# Patient Record
Sex: FEMALE | Race: WHITE
[De-identification: names, ages, dates, MRNs, and addresses within clinical notes are randomized per-mention and may not be internally consistent; named-entity substitution may affect disease eponyms.]

---

## 2020-02-09 ENCOUNTER — HOSPITAL ENCOUNTER (INPATIENT)
Dept: HOSPITAL 83 - ED | Age: 75
LOS: 4 days | Discharge: HOME | DRG: 378 | End: 2020-02-13
Attending: INTERNAL MEDICINE | Admitting: INTERNAL MEDICINE
Payer: MEDICARE

## 2020-02-09 VITALS — WEIGHT: 163.44 LBS | HEIGHT: 60 IN | BODY MASS INDEX: 32.09 KG/M2

## 2020-02-09 VITALS — DIASTOLIC BLOOD PRESSURE: 88 MMHG

## 2020-02-09 DIAGNOSIS — Z79.899: ICD-10-CM

## 2020-02-09 DIAGNOSIS — K29.71: ICD-10-CM

## 2020-02-09 DIAGNOSIS — K44.9: ICD-10-CM

## 2020-02-09 DIAGNOSIS — K25.4: Primary | ICD-10-CM

## 2020-02-09 DIAGNOSIS — E87.8: ICD-10-CM

## 2020-02-09 DIAGNOSIS — S09.90XA: ICD-10-CM

## 2020-02-09 DIAGNOSIS — Z87.891: ICD-10-CM

## 2020-02-09 DIAGNOSIS — K21.9: ICD-10-CM

## 2020-02-09 DIAGNOSIS — I16.1: ICD-10-CM

## 2020-02-09 DIAGNOSIS — I10: ICD-10-CM

## 2020-02-09 DIAGNOSIS — D72.829: ICD-10-CM

## 2020-02-10 VITALS — DIASTOLIC BLOOD PRESSURE: 105 MMHG

## 2020-02-10 VITALS — DIASTOLIC BLOOD PRESSURE: 125 MMHG

## 2020-02-10 VITALS — SYSTOLIC BLOOD PRESSURE: 154 MMHG | DIASTOLIC BLOOD PRESSURE: 70 MMHG

## 2020-02-10 VITALS — DIASTOLIC BLOOD PRESSURE: 110 MMHG

## 2020-02-10 VITALS — DIASTOLIC BLOOD PRESSURE: 71 MMHG | SYSTOLIC BLOOD PRESSURE: 151 MMHG

## 2020-02-10 VITALS — SYSTOLIC BLOOD PRESSURE: 203 MMHG | DIASTOLIC BLOOD PRESSURE: 116 MMHG

## 2020-02-10 VITALS — DIASTOLIC BLOOD PRESSURE: 63 MMHG

## 2020-02-10 VITALS — DIASTOLIC BLOOD PRESSURE: 57 MMHG | SYSTOLIC BLOOD PRESSURE: 137 MMHG

## 2020-02-10 VITALS — DIASTOLIC BLOOD PRESSURE: 89 MMHG

## 2020-02-10 VITALS — DIASTOLIC BLOOD PRESSURE: 104 MMHG | SYSTOLIC BLOOD PRESSURE: 185 MMHG

## 2020-02-10 VITALS — DIASTOLIC BLOOD PRESSURE: 80 MMHG

## 2020-02-10 VITALS — DIASTOLIC BLOOD PRESSURE: 93 MMHG

## 2020-02-10 VITALS — SYSTOLIC BLOOD PRESSURE: 180 MMHG | DIASTOLIC BLOOD PRESSURE: 103 MMHG

## 2020-02-10 VITALS — DIASTOLIC BLOOD PRESSURE: 68 MMHG

## 2020-02-10 VITALS — DIASTOLIC BLOOD PRESSURE: 78 MMHG

## 2020-02-10 LAB
ALBUMIN SERPL-MCNC: 3.8 GM/DL (ref 3.1–4.5)
ALP SERPL-CCNC: 116 U/L (ref 45–117)
ALT SERPL W P-5'-P-CCNC: 27 U/L (ref 12–78)
APPEARANCE UR: (no result)
AST SERPL-CCNC: 41 IU/L (ref 3–35)
BACTERIA #/AREA URNS HPF: (no result) /[HPF]
BILIRUB UR QL STRIP: NEGATIVE
BUN SERPL-MCNC: 15 MG/DL (ref 7–24)
CHLORIDE SERPL-SCNC: 108 MMOL/L (ref 98–107)
COLOR UR: YELLOW
CREAT SERPL-MCNC: 0.92 MG/DL (ref 0.55–1.02)
EPI CELLS #/AREA URNS HPF: (no result) /[HPF]
ERYTHROCYTE [DISTWIDTH] IN BLOOD BY AUTOMATED COUNT: 13 % (ref 0–14.5)
GLUCOSE UR QL: NEGATIVE
HCT VFR BLD AUTO: 39.8 % (ref 37–47)
HCT VFR BLD AUTO: 43.8 % (ref 37–47)
HGB BLD-MCNC: 13.7 G/DL (ref 12–16)
HGB BLD-MCNC: 15 G/DL (ref 12–16)
HGB UR QL STRIP: (no result)
INR BLD: 1 (ref 2–3.5)
KETONES UR QL STRIP: NEGATIVE
LEUKOCYTE ESTERASE UR QL STRIP: NEGATIVE
MCH RBC QN AUTO: 30.8 PG (ref 27–31)
MCHC RBC AUTO-ENTMCNC: 34.2 G/DL (ref 33–37)
MCV RBC AUTO: 89.9 FL (ref 81–99)
NITRITE UR QL STRIP: NEGATIVE
NRBC BLD QL AUTO: 0 % (ref 0–0)
PH UR STRIP: 6 [PH] (ref 5–9)
PLATELET # BLD AUTO: 143 10*3/UL (ref 130–400)
PLATELET SUFFICIENCY: NORMAL
PMV BLD AUTO: 12.7 FL (ref 9.6–12.3)
POTASSIUM SERPL-SCNC: 3.5 MMOL/L (ref 3.5–5.1)
PROT SERPL-MCNC: 7.3 GM/DL (ref 6.4–8.2)
RBC # BLD AUTO: 4.87 10*6/UL (ref 4.1–5.1)
RBC #/AREA URNS HPF: (no result) RBC/HPF (ref 0–2)
RBC MORPH BLD: NORMAL
SODIUM SERPL-SCNC: 138 MMOL/L (ref 136–145)
SP GR UR: 1.02 (ref 1–1.03)
TOTAL CELLS COUNTED: 100 #CELLS
TROPONIN I SERPL-MCNC: < 0.015 NG/ML (ref ?–0.04)
UROBILINOGEN UR STRIP-MCNC: 0.2 E.U./DL (ref 0.2–1)
WBC #/AREA URNS HPF: (no result) WBC/HPF (ref 0–5)
WBC NRBC COR # BLD AUTO: 14.1 10*3/UL (ref 4.8–10.8)

## 2020-02-10 NOTE — NUR
A 74, admitted to , under the
services of REYNA Coffman DO with a diagnosis of GI BLEED, HTN.
Chief complaint is FALL, NAUSEA, VOMITING.
Patient arrived via bed from ER.
Monitor applied. Initial assessment completed.
Vital signs taken and recorded.
REYNA COFFMAN DO notified of admission to the unit.
Orders received.
See assessment for past medical history, medications
and allergies.
Patient and/or family oriented to unit. [facility]
visitation policy reviewed.
Clothing/patient valuable form completed.
 
PARMINDER GILLIAM

## 2020-02-10 NOTE — NUR
PT RESTING WITHOUT ANY COMPLAINTS, NO ACTIVE VOIMTING NOTED. FAMILY AT THE
BEDSIDE. PT WAS WANTING TO BE ADMITTED HERE IF POSSIBLE. NOTIFIED DR RANDOLPH
WITH THERE REQUEST. HOSPITALIST TO BE NOTIFIED.

## 2020-02-11 VITALS — DIASTOLIC BLOOD PRESSURE: 84 MMHG

## 2020-02-11 VITALS — SYSTOLIC BLOOD PRESSURE: 184 MMHG | DIASTOLIC BLOOD PRESSURE: 90 MMHG

## 2020-02-11 VITALS — DIASTOLIC BLOOD PRESSURE: 86 MMHG

## 2020-02-11 VITALS — DIASTOLIC BLOOD PRESSURE: 56 MMHG

## 2020-02-11 VITALS — DIASTOLIC BLOOD PRESSURE: 85 MMHG | SYSTOLIC BLOOD PRESSURE: 147 MMHG

## 2020-02-11 VITALS — SYSTOLIC BLOOD PRESSURE: 140 MMHG | DIASTOLIC BLOOD PRESSURE: 80 MMHG

## 2020-02-11 VITALS — DIASTOLIC BLOOD PRESSURE: 80 MMHG

## 2020-02-11 LAB
25(OH)D3 SERPL-MCNC: 18.1 NG/ML (ref 30–100)
ALBUMIN SERPL-MCNC: 3.5 GM/DL (ref 3.1–4.5)
ALP SERPL-CCNC: 102 U/L (ref 45–117)
ALT SERPL W P-5'-P-CCNC: 28 U/L (ref 12–78)
AST SERPL-CCNC: 39 IU/L (ref 3–35)
BASOPHILS # BLD AUTO: 0 10*3/UL (ref 0–0.1)
BASOPHILS NFR BLD AUTO: 0.2 % (ref 0–1)
BUN SERPL-MCNC: 12 MG/DL (ref 7–24)
CHLORIDE SERPL-SCNC: 109 MMOL/L (ref 98–107)
CHOLEST SERPL-MCNC: 150 MG/DL (ref ?–200)
CREAT SERPL-MCNC: 0.89 MG/DL (ref 0.55–1.02)
EOSINOPHIL # BLD AUTO: 0 10*3/UL (ref 0–0.4)
EOSINOPHIL # BLD AUTO: 0.1 % (ref 1–4)
ERYTHROCYTE [DISTWIDTH] IN BLOOD BY AUTOMATED COUNT: 13.2 % (ref 0–14.5)
HCT VFR BLD AUTO: 41.7 % (ref 37–47)
HDLC SERPL-MCNC: 50 MG/DL (ref 40–60)
HGB BLD-MCNC: 14.1 G/DL (ref 12–16)
LDLC SERPL DIRECT ASSAY-MCNC: 76 MG/DL (ref 9–159)
LYMPHOCYTES # BLD AUTO: 1.5 10*3/UL (ref 1.3–4.4)
LYMPHOCYTES NFR BLD AUTO: 16.3 % (ref 27–41)
MCH RBC QN AUTO: 30.2 PG (ref 27–31)
MCHC RBC AUTO-ENTMCNC: 33.8 G/DL (ref 33–37)
MCV RBC AUTO: 89.3 FL (ref 81–99)
MONOCYTES # BLD AUTO: 0.5 10*3/UL (ref 0.1–1)
MONOCYTES NFR BLD MANUAL: 5.1 % (ref 3–9)
NEUT #: 7.3 10*3/UL (ref 2.3–7.9)
NEUT %: 77.9 % (ref 47–73)
NRBC BLD QL AUTO: 0 10*3/UL (ref 0–0)
PHOSPHATE SERPL-MCNC: 1.7 MG/DL (ref 2.5–4.9)
PLATELET # BLD AUTO: 149 10*3/UL (ref 130–400)
PMV BLD AUTO: 12.9 FL (ref 9.6–12.3)
POTASSIUM SERPL-SCNC: 3.2 MMOL/L (ref 3.5–5.1)
PROT SERPL-MCNC: 7.1 GM/DL (ref 6.4–8.2)
RBC # BLD AUTO: 4.67 10*6/UL (ref 4.1–5.1)
SODIUM SERPL-SCNC: 140 MMOL/L (ref 136–145)
T4 FREE SERPL-MCNC: 1.22 NG/DL (ref 0.76–1.46)
TRIGL SERPL-MCNC: 122 MG/DL (ref ?–150)
TSH SERPL DL<=0.005 MIU/L-ACNC: 4.02 UIU/ML (ref 0.36–4.75)
VITAMIN B12: 342 PG/ML (ref 247–911)
VLDLC SERPL CALC-MCNC: 24 MG/DL (ref 6–40)
WBC NRBC COR # BLD AUTO: 9.4 10*3/UL (ref 4.8–10.8)

## 2020-02-11 NOTE — NUR
PHYSICAL THERAPY
 
Screen received pt is from home with fall down steps and GI bleed. Pt would
benefit from PT consult to assess mobility and overall safety once medically
appropriate, thank you
 
 
Shabnam Modi PT

## 2020-02-11 NOTE — NUR
in to talk to patient.
Patient states lives at home with her daughter, son-in-law, and grandson.
There are 12 steps in the home.
Physician: no family physician
Pharmacy: [facility]
Home health services: none
Patient's level of ADLs: INDEPENDENT
Patient has working utilities: yes
DME: none
Follow-up physician's appointment after d/c: will be made by the hospitalist
nurse director upon discharge
Does patient want to access PORTAL?: no
Discharge plan discussed with patient. She lives at home with her family. She
is independent in her ADLs and ambulation. Discussed home health care services
and she denies any home needs at this time. When medically stable she will be
discharged to home. She states her daughter will provide transportation on
discharge.
 
OLIMPIA TAYLOR

## 2020-02-11 NOTE — NUR
BLOOD PRESSURE 182/84 MANUALLY. MEDICATED WITH APRESOLINE 10MG IV PER PRN
ORDER. WILL CONTINUE TO MONITOR.

## 2020-02-11 NOTE — NUR
Nursing screen received and chart reviewed. Patient admitted for GI bleed and
a fall. If patient has a decline in ADLs, functional transfers, and mobility,
please send OT orders. Thank you.
 
Sugey Brewer, OTR/L

## 2020-02-11 NOTE — NUR
PT RESTING IN BED TALKING WITH FAMILY, A&O, PLEASANT AND COOPERATIVE. RESP
NONLABORED. NO ACUTE DISTRESS NOTED. NO COMPLAINTS VOICED. IRMA PATENT.

## 2020-02-11 NOTE — NUR
PATIENT C/O RIGHT HIP PAIN WITH MOVEMENT AND AMBULATION. MEDICATED WITH ULTRAM
PER PRN ORDER. WILL CONTINUE TO MONITOR.

## 2020-02-11 NOTE — NUR
PT RESTING IN BED WITH EYES CLOSED, AWAKENS WITH EASE. RESP NONLABORED. NO
ACUTE DISTRESS NOTED. NO COMPLAINTS VOICED. IV PATENT AND IVF'S INFUSING AS
ORDERED WITHOUT DIFFICULTY.

## 2020-02-12 VITALS — DIASTOLIC BLOOD PRESSURE: 82 MMHG

## 2020-02-12 VITALS — DIASTOLIC BLOOD PRESSURE: 90 MMHG

## 2020-02-12 VITALS — DIASTOLIC BLOOD PRESSURE: 69 MMHG

## 2020-02-12 VITALS — DIASTOLIC BLOOD PRESSURE: 76 MMHG

## 2020-02-12 VITALS — DIASTOLIC BLOOD PRESSURE: 86 MMHG

## 2020-02-12 VITALS — SYSTOLIC BLOOD PRESSURE: 149 MMHG | DIASTOLIC BLOOD PRESSURE: 64 MMHG

## 2020-02-12 VITALS — DIASTOLIC BLOOD PRESSURE: 86 MMHG | SYSTOLIC BLOOD PRESSURE: 164 MMHG

## 2020-02-12 LAB
ALBUMIN SERPL-MCNC: 4 GM/DL (ref 3.1–4.5)
ALP SERPL-CCNC: 119 U/L (ref 45–117)
ALT SERPL W P-5'-P-CCNC: 31 U/L (ref 12–78)
AST SERPL-CCNC: 38 IU/L (ref 3–35)
BASOPHILS # BLD AUTO: 0 10*3/UL (ref 0–0.1)
BASOPHILS NFR BLD AUTO: 0.2 % (ref 0–1)
BUN SERPL-MCNC: 12 MG/DL (ref 7–24)
CHLORIDE SERPL-SCNC: 107 MMOL/L (ref 98–107)
CREAT SERPL-MCNC: 0.95 MG/DL (ref 0.55–1.02)
EOSINOPHIL # BLD AUTO: 0 10*3/UL (ref 0–0.4)
EOSINOPHIL # BLD AUTO: 0.3 % (ref 1–4)
ERYTHROCYTE [DISTWIDTH] IN BLOOD BY AUTOMATED COUNT: 13.2 % (ref 0–14.5)
HCT VFR BLD AUTO: 46 % (ref 37–47)
HGB BLD-MCNC: 15.8 G/DL (ref 12–16)
LYMPHOCYTES # BLD AUTO: 2.7 10*3/UL (ref 1.3–4.4)
LYMPHOCYTES NFR BLD AUTO: 24 % (ref 27–41)
MCH RBC QN AUTO: 30.7 PG (ref 27–31)
MCHC RBC AUTO-ENTMCNC: 34.3 G/DL (ref 33–37)
MCV RBC AUTO: 89.3 FL (ref 81–99)
MONOCYTES # BLD AUTO: 0.7 10*3/UL (ref 0.1–1)
MONOCYTES NFR BLD MANUAL: 6.1 % (ref 3–9)
NEUT #: 7.7 10*3/UL (ref 2.3–7.9)
NEUT %: 69.1 % (ref 47–73)
NRBC BLD QL AUTO: 0 10*3/UL (ref 0–0)
PLATELET # BLD AUTO: 217 10*3/UL (ref 130–400)
PMV BLD AUTO: 12.9 FL (ref 9.6–12.3)
POTASSIUM SERPL-SCNC: 3.5 MMOL/L (ref 3.5–5.1)
PROT SERPL-MCNC: 7.4 GM/DL (ref 6.4–8.2)
RBC # BLD AUTO: 5.15 10*6/UL (ref 4.1–5.1)
SODIUM SERPL-SCNC: 136 MMOL/L (ref 136–145)
WBC NRBC COR # BLD AUTO: 11.1 10*3/UL (ref 4.8–10.8)

## 2020-02-12 PROCEDURE — 0DB68ZX EXCISION OF STOMACH, VIA NATURAL OR ARTIFICIAL OPENING ENDOSCOPIC, DIAGNOSTIC: ICD-10-PCS | Performed by: INTERNAL MEDICINE

## 2020-02-12 NOTE — NUR
MORNING ASSESSMENT COMPLETE. PT SLEEPING, AROUSES EASILY. NO VOICED COMPLAINTS
AT THIS TIME.   [facility]

## 2020-02-12 NOTE — NUR
PT VISITING FAMILY, WAITING TO GO DOWN FOR EGD. PATIENT PLEASANT AND
COOPERATIVE.  [facility]

## 2020-02-12 NOTE — NUR
PATIENT ASSESSMENT COMPLETED AT THIS TIME WITHOUT INCIDENT, MEDICATIONS GIVEN
WITHOUT INCIDENT. CALL LIGHT WITHIN REACH WILL CONTINUE TO MONITOR.

## 2020-02-13 VITALS — SYSTOLIC BLOOD PRESSURE: 157 MMHG | DIASTOLIC BLOOD PRESSURE: 83 MMHG

## 2020-02-13 VITALS — SYSTOLIC BLOOD PRESSURE: 150 MMHG | DIASTOLIC BLOOD PRESSURE: 77 MMHG

## 2020-02-13 VITALS — DIASTOLIC BLOOD PRESSURE: 92 MMHG

## 2020-03-04 ENCOUNTER — HOSPITAL ENCOUNTER (OUTPATIENT)
Dept: HOSPITAL 83 - RESCLI | Age: 75
Discharge: HOME | End: 2020-03-04
Attending: INTERNAL MEDICINE
Payer: MEDICARE

## 2020-03-04 DIAGNOSIS — T39.395D: Primary | ICD-10-CM

## 2020-03-04 DIAGNOSIS — H35.30: ICD-10-CM

## 2020-03-04 DIAGNOSIS — I10: ICD-10-CM

## 2020-03-04 DIAGNOSIS — Z53.20: ICD-10-CM

## 2020-03-04 LAB
BASOPHILS # BLD AUTO: 0 10*3/UL (ref 0–0.1)
BASOPHILS NFR BLD AUTO: 0.3 % (ref 0–1)
EOSINOPHIL # BLD AUTO: 0 10*3/UL (ref 0–0.4)
EOSINOPHIL # BLD AUTO: 0.4 % (ref 1–4)
ERYTHROCYTE [DISTWIDTH] IN BLOOD BY AUTOMATED COUNT: 12.9 % (ref 0–14.5)
HCT VFR BLD AUTO: 44.5 % (ref 37–47)
HGB BLD-MCNC: 15.5 G/DL (ref 12–16)
LYMPHOCYTES # BLD AUTO: 1.5 10*3/UL (ref 1.3–4.4)
LYMPHOCYTES NFR BLD AUTO: 19.1 % (ref 27–41)
MCH RBC QN AUTO: 30.4 PG (ref 27–31)
MCHC RBC AUTO-ENTMCNC: 34.8 G/DL (ref 33–37)
MCV RBC AUTO: 87.3 FL (ref 81–99)
MONOCYTES # BLD AUTO: 0.5 10*3/UL (ref 0.1–1)
MONOCYTES NFR BLD MANUAL: 6 % (ref 3–9)
NEUT #: 5.9 10*3/UL (ref 2.3–7.9)
NEUT %: 73.8 % (ref 47–73)
NRBC BLD QL AUTO: 0 % (ref 0–0)
PLATELET # BLD AUTO: 200 10*3/UL (ref 130–400)
PMV BLD AUTO: 11.9 FL (ref 9.6–12.3)
RBC # BLD AUTO: 5.1 10*6/UL (ref 4.1–5.1)
WBC NRBC COR # BLD AUTO: 8 10*3/UL (ref 4.8–10.8)

## 2020-06-16 ENCOUNTER — HOSPITAL ENCOUNTER (OUTPATIENT)
Dept: HOSPITAL 83 - RESCLI | Age: 75
Discharge: HOME | End: 2020-06-16
Attending: INTERNAL MEDICINE
Payer: MEDICARE

## 2020-06-16 DIAGNOSIS — R00.0: ICD-10-CM

## 2020-06-16 DIAGNOSIS — Z12.31: Primary | ICD-10-CM

## 2020-06-16 DIAGNOSIS — Z79.899: ICD-10-CM

## 2020-06-16 DIAGNOSIS — F17.200: ICD-10-CM

## 2020-06-16 DIAGNOSIS — Z12.11: ICD-10-CM

## 2020-06-16 DIAGNOSIS — Z79.82: ICD-10-CM

## 2020-06-16 DIAGNOSIS — H35.3220: ICD-10-CM

## 2020-06-16 DIAGNOSIS — I10: ICD-10-CM

## 2020-06-16 DIAGNOSIS — T39.395D: ICD-10-CM

## 2020-06-29 ENCOUNTER — HOSPITAL ENCOUNTER (OUTPATIENT)
Dept: HOSPITAL 83 - MAMMO | Age: 75
Discharge: HOME | End: 2020-06-29
Attending: STUDENT IN AN ORGANIZED HEALTH CARE EDUCATION/TRAINING PROGRAM
Payer: MEDICARE

## 2020-06-29 DIAGNOSIS — Z12.31: Primary | ICD-10-CM
